# Patient Record
Sex: FEMALE | Race: WHITE | Employment: UNEMPLOYED | URBAN - METROPOLITAN AREA
[De-identification: names, ages, dates, MRNs, and addresses within clinical notes are randomized per-mention and may not be internally consistent; named-entity substitution may affect disease eponyms.]

---

## 2023-06-24 ENCOUNTER — HOSPITAL ENCOUNTER (EMERGENCY)
Facility: HOSPITAL | Age: 38
Discharge: HOME | End: 2023-06-24
Payer: MEDICAID

## 2023-06-24 VITALS
DIASTOLIC BLOOD PRESSURE: 67 MMHG | HEIGHT: 64 IN | SYSTOLIC BLOOD PRESSURE: 115 MMHG | BODY MASS INDEX: 35.85 KG/M2 | HEART RATE: 75 BPM | RESPIRATION RATE: 16 BRPM | WEIGHT: 210 LBS | OXYGEN SATURATION: 99 % | TEMPERATURE: 98.5 F

## 2023-06-24 DIAGNOSIS — L02.91 ABSCESS: Primary | ICD-10-CM

## 2023-06-24 PROCEDURE — 99283 EMERGENCY DEPT VISIT LOW MDM: CPT

## 2023-06-24 RX ORDER — NAPROXEN 500 MG/1
500 TABLET ORAL 2 TIMES DAILY
Qty: 60 TABLET | Refills: 0 | Status: SHIPPED | OUTPATIENT
Start: 2023-06-24

## 2023-06-24 RX ORDER — DOXYCYCLINE HYCLATE 100 MG
100 TABLET ORAL 2 TIMES DAILY
Qty: 20 TABLET | Refills: 0 | Status: SHIPPED | OUTPATIENT
Start: 2023-06-24 | End: 2023-07-04

## 2023-06-24 ASSESSMENT — PAIN DESCRIPTION - FREQUENCY: FREQUENCY: CONTINUOUS

## 2023-06-24 ASSESSMENT — ENCOUNTER SYMPTOMS
ABDOMINAL PAIN: 0
BACK PAIN: 0
SHORTNESS OF BREATH: 0

## 2023-06-24 ASSESSMENT — PAIN DESCRIPTION - ORIENTATION: ORIENTATION: RIGHT

## 2023-06-24 ASSESSMENT — PAIN DESCRIPTION - LOCATION: LOCATION: ARM

## 2023-06-24 ASSESSMENT — PAIN DESCRIPTION - PAIN TYPE: TYPE: ACUTE PAIN

## 2023-06-24 ASSESSMENT — PAIN - FUNCTIONAL ASSESSMENT: PAIN_FUNCTIONAL_ASSESSMENT: 0-10

## 2023-06-24 ASSESSMENT — PAIN SCALES - GENERAL: PAINLEVEL_OUTOF10: 7

## 2023-06-24 NOTE — ED PROVIDER NOTES
[address], [address] [place] [address] [address] Floor, [address], [place] [id number]      Phone: [phone]   doxycycline hyclate 100 MG tablet  naproxen 500 MG tablet           DISCONTINUED MEDICATIONS:  Discharge Medication List as of 6/24/2023 10:07 AM          I have seen and evaluated the patient. My supervision physician was available for consultation. I am the Primary Clinician of Record. AI Angela NP (electronically signed)    (Please note that parts of this dictation were completed with voice recognition software. Quite often unanticipated grammatical, syntax, homophones, and other interpretive errors are inadvertently transcribed by the computer software. Please disregards these errors.  Please excuse any errors that have escaped final proofreading.)         AI Gonzalez NP  06/24/23 1554

## 2023-06-24 NOTE — ED NOTES
Patient (s)  given copy of dc instructions and 2 script(s). Patient (s)  verbalized understanding of instructions and script (s). Patient given a current medication reconciliation form and verbalized understanding of their medications. Patient (s) verbalized understanding of the importance of discussing medications with his or her physician or clinic they will be following up with. Patient alert and oriented and in no acute distress. Patient discharged home, patient offered wheelchair, patient declines wheelchair.              [address]  06/24/23 0895

## 2023-06-24 NOTE — ED NOTES
Patient here with c/o skin problem, abscess under right axilla. States hx of them in the past, states current symptoms for 4 days. Patient denies fevers. Patient denies drainage. Emergency Department Nursing Plan of Care       The Nursing Plan of Care is developed from the Nursing assessment and Emergency Department Attending provider initial evaluation. The plan of care may be reviewed in the ED Provider note.       The Plan of Care was developed with the following considerations:  Patient / Family readiness to learn indicated by:verbalized understanding  Persons(s) to be included in education: patient  Barriers to Learning/Limitations:None      Signed     Bella Mcgarry RN    6/24/2023   9:51 AM         Bella Mcgarry RN  06/24/23 [id number]